# Patient Record
Sex: FEMALE | NOT HISPANIC OR LATINO | URBAN - METROPOLITAN AREA
[De-identification: names, ages, dates, MRNs, and addresses within clinical notes are randomized per-mention and may not be internally consistent; named-entity substitution may affect disease eponyms.]

---

## 2021-11-26 ENCOUNTER — EMERGENCY (EMERGENCY)
Facility: HOSPITAL | Age: 26
LOS: 1 days | Discharge: HOME | End: 2021-11-26
Attending: EMERGENCY MEDICINE | Admitting: EMERGENCY MEDICINE
Payer: MEDICAID

## 2021-11-26 VITALS
RESPIRATION RATE: 16 BRPM | OXYGEN SATURATION: 98 % | DIASTOLIC BLOOD PRESSURE: 56 MMHG | TEMPERATURE: 98 F | SYSTOLIC BLOOD PRESSURE: 112 MMHG | HEART RATE: 75 BPM

## 2021-11-26 DIAGNOSIS — Z98.890 OTHER SPECIFIED POSTPROCEDURAL STATES: Chronic | ICD-10-CM

## 2021-11-26 PROCEDURE — 99283 EMERGENCY DEPT VISIT LOW MDM: CPT

## 2021-11-26 NOTE — ED PROVIDER NOTE - PATIENT PORTAL LINK FT
You can access the FollowMyHealth Patient Portal offered by [facility] by registering at the following website: [email or web address]. By joining [facility]’s FollowMyHealth portal, you will also be able to view your health information using other applications (apps) compatible with our system.

## 2021-11-26 NOTE — ED PROVIDER NOTE - NSPTACCESSSVCSAPPTDETAILS_ED_ALL_ED_FT
URGENT PLASTIC CONSULT FOR WOUND DEHISCENCE WITHIN 1-2 DAYS IF POSSIBLE  Pt phone # [phone]  [employer]

## 2021-11-26 NOTE — ED PROVIDER NOTE - CLINICAL SUMMARY MEDICAL DECISION MAKING FREE TEXT BOX
Gama: Had a breast surgery in [place] a few months ago, complicated by infections and wound dehiscence. Was on Abx. A few days ago, she noticed dehiscence under R breast. No F/pus/spreading redness. Started prophylactic ABx her breast surgeon had prescribed. PE: no e/o infection; there is s 4 cm area of dehiscence under the R breast. Will refer to local Plastic Surg. using DCL.

## 2021-11-26 NOTE — ED PROVIDER NOTE - ATTENDING CONTRIBUTION TO CARE
I performed a face-to-face evaluation of the patient and performed a history and physical examination. I agree with the history and physical examination.    Had a breast surgery in [place] a few months ago, complicated by infections and wound dehiscence. Was on Abx. A few days ago, she noticed dehiscence under R breast. No F/pus/spreading redness. Started prophylactic ABx her breast surgeon had prescribed. PE: no e/o infection; there is s 4 cm area of dehiscence under the R breast. Will refer to local Plastic Surg. using DCL.

## 2021-11-26 NOTE — ED ADULT TRIAGE NOTE - CHIEF COMPLAINT QUOTE
Pt had breast augmentation x 2weeks ago in [facility], pt saw there is a small opening on the bottom of her b/l breast. concern there is infection as she also saw some drainage.

## 2021-11-26 NOTE — ED PROVIDER NOTE - NSFOLLOWUPINSTRUCTIONS_ED_ALL_ED_FT
Patient advised to follow up with Plastic surgeon within next few days and told to return to the emergency department immediately for any new or concerning symptoms such as worsening wound opening, fevers, chills, redness, pain  OR ANY OTHER COMPLAINTS. Patient agrees with plan.    Rest, stay hydrated  Avoid strenuous activity   Keep area clean, dry and intact

## 2021-11-26 NOTE — ED PROVIDER NOTE - OBJECTIVE STATEMENT
Gama: Had a breast surgery in [place] a few months ago, complicated by infections and wound dehiscence. Was on Abx. A few days ago, she noticed dehiscence under R breast. No F/pus/spreading redness. Started prophylactic ABx her breast surgeon had prescribed. HPI- Patient is a 26 y.o female w/ recent hx of breast augmentation and lift done in [place] 10/11 complicated by post-op wound dehishence and infection with surgical repair who just arrived back to [place] 3 days ago now presenting to ED for wound check. Pt states that she noticed some dehiscence of wound underneath Rt breast. Pt spoke to her surgeon via phone and was sent an abx which she just started today, patient previously finished a course of abx yesterday. Pt has another virtual appointment with her surgeon on tuesday. Pt was concerned wound may be infected so came to ED. Denies fevers, chills, pus, redness, cp, sob, pain or any other complaints.     Gama: Had a breast surgery in [place] a few months ago, complicated by infections and wound dehiscence. Was on Abx. A few days ago, she noticed dehiscence under R breast. No F/pus/spreading redness. Started prophylactic ABx her breast surgeon had prescribed.

## 2021-11-26 NOTE — ED PROVIDER NOTE - PHYSICAL EXAMINATION
Well appearing, well nourished, awake, alert, oriented to person, place, time/situation and in no apparent distress.    Airway patent    Eyes without scleral injection. No jaundice.    Strong pulse.    Respirations unlabored.    Abdomen soft, non-tender, no guarding.    Spine appears normal, range of motion is not limited, no muscle or joint tenderness.    Alert and oriented, no gross motor or sensory deficits.    Skin: no e/o infection; there is s 4 cm area of dehiscence under the R breast.    No SI/HI. Well appearing, well nourished, awake, alert, oriented to person, place, time/situation and in no apparent distress.    Airway patent    Eyes without scleral injection. No jaundice.    Strong pulse.    Respirations unlabored.    Abdomen soft, non-tender, no guarding.    Spine appears normal, range of motion is not limited, no muscle or joint tenderness.    Alert and oriented, no gross motor or sensory deficits.    Skin: no e/o infection; there is s 4 cm area of dehiscence under the R breast. No redness. No fluctuance. STAR Biggs and MD Malloy examined patient together.     No SI/HI.

## 2021-11-27 NOTE — ED POST DISCHARGE NOTE - ADDITIONAL DOCUMENTATION
STAR Moody: Pt called, states she was told yesterday that she would get a call today to schedule an appointment with a plastic surgeon for follow up in 1-3 days. Discharge adalid no longer in ED today, pt provide with [phone] information for online booking, advised pt to return to ED with any worsening or concerning symptoms. Informed patient discharge rebeccalizethroxana should be calling her monday to confirm follow up appointment was scheduled
